# Patient Record
Sex: FEMALE
[De-identification: names, ages, dates, MRNs, and addresses within clinical notes are randomized per-mention and may not be internally consistent; named-entity substitution may affect disease eponyms.]

---

## 2019-10-04 ENCOUNTER — APPOINTMENT (OUTPATIENT)
Dept: OTOLARYNGOLOGY | Facility: CLINIC | Age: 44
End: 2019-10-04
Payer: COMMERCIAL

## 2019-10-04 VITALS
TEMPERATURE: 97.6 F | SYSTOLIC BLOOD PRESSURE: 124 MMHG | HEIGHT: 61 IN | OXYGEN SATURATION: 100 % | WEIGHT: 108 LBS | BODY MASS INDEX: 20.39 KG/M2 | HEART RATE: 95 BPM | DIASTOLIC BLOOD PRESSURE: 76 MMHG

## 2019-10-04 DIAGNOSIS — R19.8 OTHER SPECIFIED SYMPTOMS AND SIGNS INVOLVING THE DIGESTIVE SYSTEM AND ABDOMEN: ICD-10-CM

## 2019-10-04 DIAGNOSIS — Z86.39 PERSONAL HISTORY OF OTHER ENDOCRINE, NUTRITIONAL AND METABOLIC DISEASE: ICD-10-CM

## 2019-10-04 DIAGNOSIS — Z80.9 FAMILY HISTORY OF MALIGNANT NEOPLASM, UNSPECIFIED: ICD-10-CM

## 2019-10-04 DIAGNOSIS — Z83.3 FAMILY HISTORY OF DIABETES MELLITUS: ICD-10-CM

## 2019-10-04 DIAGNOSIS — Z87.11 PERSONAL HISTORY OF PEPTIC ULCER DISEASE: ICD-10-CM

## 2019-10-04 DIAGNOSIS — Z78.9 OTHER SPECIFIED HEALTH STATUS: ICD-10-CM

## 2019-10-04 DIAGNOSIS — Z82.2 FAMILY HISTORY OF DEAFNESS AND HEARING LOSS: ICD-10-CM

## 2019-10-04 DIAGNOSIS — Z83.49 FAMILY HISTORY OF OTHER ENDOCRINE, NUTRITIONAL AND METABOLIC DISEASES: ICD-10-CM

## 2019-10-04 PROCEDURE — 99204 OFFICE O/P NEW MOD 45 MIN: CPT

## 2019-10-04 NOTE — PHYSICAL EXAM
[Midline] : trachea located in midline position [de-identified] : 5 mm brown lesion r anterior tongue [Normal] : no rashes

## 2019-10-04 NOTE — HISTORY OF PRESENT ILLNESS
[de-identified] : 44 yo healthy woman noticed a brown pigmented lesion on her anterior tongue 1.5 mo ago. It is not changing in size.  denies pain or bleeding. did not bite it. nonsmoker. relative had thyroid ca. It does nto hurt or interfere with eating. No fh relevant to cc. She has some posterior neck tightness a few d after her child had a uri.

## 2019-10-18 ENCOUNTER — APPOINTMENT (OUTPATIENT)
Dept: OTOLARYNGOLOGY | Facility: CLINIC | Age: 44
End: 2019-10-18
Payer: COMMERCIAL

## 2019-10-18 VITALS
SYSTOLIC BLOOD PRESSURE: 118 MMHG | TEMPERATURE: 97.9 F | OXYGEN SATURATION: 96 % | DIASTOLIC BLOOD PRESSURE: 70 MMHG | HEART RATE: 103 BPM

## 2019-10-18 PROCEDURE — 41110 EXCISION OF TONGUE LESION: CPT

## 2019-10-18 PROCEDURE — 99213 OFFICE O/P EST LOW 20 MIN: CPT | Mod: 25

## 2019-10-18 RX ORDER — CEFUROXIME AXETIL 500 MG/1
500 TABLET ORAL
Qty: 10 | Refills: 0 | Status: ACTIVE | COMMUNITY
Start: 2019-10-18 | End: 1900-01-01

## 2019-10-18 RX ORDER — CHLORHEXIDINE GLUCONATE, 0.12% ORAL RINSE 1.2 MG/ML
0.12 SOLUTION DENTAL
Qty: 1 | Refills: 2 | Status: ACTIVE | COMMUNITY
Start: 2019-10-18 | End: 1900-01-01

## 2019-10-18 NOTE — HISTORY OF PRESENT ILLNESS
[de-identified] : followup 42 yo woman with brown spot 4 mm on r anterior tongue. she is sure it is less than a year old no pain or drainage. here for removal. nonsmoker.

## 2019-10-18 NOTE — PROCEDURE
[FreeTextEntry1] : removal of tongue mass [FreeTextEntry2] : tongue mass [FreeTextEntry3] : after informed consent was obtained tongue was infiltrated with 1 percent lido/1:100K epi and pt prepped and draped in usual sterile condition. Surgical pause was performed. lesion was excised with #15 BLADE. exclllent hemostasis was obtained with silver nitrate. specimen sent to pathologu

## 2019-10-18 NOTE — PHYSICAL EXAM
[de-identified] : 5 mm brown flat lesion r anterior tongue [Normal] : no masses and lesions seen, face is symmetric

## 2019-10-18 NOTE — REASON FOR VISIT
[Subsequent Evaluation] : a subsequent evaluation for [FreeTextEntry2] : brown spot on r anterior tongue

## 2019-10-25 ENCOUNTER — APPOINTMENT (OUTPATIENT)
Dept: OTOLARYNGOLOGY | Facility: CLINIC | Age: 44
End: 2019-10-25
Payer: COMMERCIAL

## 2019-10-25 VITALS
TEMPERATURE: 97.8 F | HEART RATE: 93 BPM | OXYGEN SATURATION: 99 % | DIASTOLIC BLOOD PRESSURE: 73 MMHG | RESPIRATION RATE: 17 BRPM | SYSTOLIC BLOOD PRESSURE: 111 MMHG

## 2019-10-25 DIAGNOSIS — R22.0 LOCALIZED SWELLING, MASS AND LUMP, HEAD: ICD-10-CM

## 2019-10-25 PROCEDURE — 99024 POSTOP FOLLOW-UP VISIT: CPT

## 2019-10-26 PROBLEM — R22.0 TONGUE MASS: Status: ACTIVE | Noted: 2019-10-04

## 2019-10-26 NOTE — REASON FOR VISIT
[Subsequent Evaluation] : a subsequent evaluation for [FreeTextEntry2] : followup removal of tongue mass

## 2019-10-26 NOTE — HISTORY OF PRESENT ILLNESS
[de-identified] : followup biopsy of tongue pigmented lesion in office 1 week ago\par she has no complaints; wound healing well, eschar fell off 2-3 d p procedure

## 2019-11-08 ENCOUNTER — APPOINTMENT (OUTPATIENT)
Dept: OTOLARYNGOLOGY | Facility: CLINIC | Age: 44
End: 2019-11-08

## 2022-10-24 ENCOUNTER — RESULT REVIEW (OUTPATIENT)
Age: 47
End: 2022-10-24